# Patient Record
Sex: FEMALE | Race: OTHER | URBAN - METROPOLITAN AREA
[De-identification: names, ages, dates, MRNs, and addresses within clinical notes are randomized per-mention and may not be internally consistent; named-entity substitution may affect disease eponyms.]

---

## 2020-03-03 NOTE — PATIENT DISCUSSION
[facility] eye lid treatment OU qhs.
Discussed condition and exacerbating conditions/situations (e.g., dry/arid environments, overhead fans, air conditioners, side effect of medications).
Discussed lid hygiene, warm compress and eyelid wash.
Discussed the importance of blood pressure control in the prevention of ocular complications.
Discussed the risks/benefits of laser capsulotomy. Observation recommended.
Doxyclycline 100 mg PO q12h x 2 weeks. completed.
Doxyclycline PO.
Erythromycin ointment.
Good postoperative appearance.
Good postoperative appearance. Follow.
Maxitrol Raf apply eyelid margins BID x 2 weeks. Stop.
Monitor.
Patient is cleared for anesthesia.
Patient made aware of 24/7 emergency services.
Photo next visit.
Recommended artificial tears to use: 1 drop 4x a day in both eyes.
Recommended evaluation by oculoplastic specialist for further treatment.
Recommended observation.
Recommended warm compresses twice daily.
Warm Compresses 5 min 3x/day x 2 weeks.
Warm compresses / lid scrubs twice daily.
all options explained, no ailyn, leidy, YOHANNES meng OU.
endocoat, topical.
scl have been out for 2 weeks.
Yes

## 2020-03-04 NOTE — PATIENT DISCUSSION
Surgical vs nonsurgical treatment options were discussed.  Patient desires to pursue conservative therapy.  Begin Maxitrol drops TID x 2 weeks OD then stop.  Patient to return  in 3 weeks for follow up possible biopsy if no resolution of lid margin involvement.

## 2021-11-11 NOTE — PROCEDURE NOTE: SURGICAL
<p>Prior to commencing surgery patient identification, surgical procedure, site, and side were confirmed by Dr. Isaias Canela. Following topical proparacaine anesthesia, the patient was positioned at the YAG laser, a contact lens coupled to the cornea with methylcellulose and an axial posterior capsulotomy performed without complication using 3.0 Mj x 82. Excess methylcellulose was washed from the eye, one drop of Alphagan was instilled and the patient returned to the holding area having tolerated the procedure well and without complication. </p><p>MR [id number]</p>

## 2022-03-04 ENCOUNTER — NEW PATIENT (OUTPATIENT)
Dept: URBAN - METROPOLITAN AREA CLINIC 14 | Facility: CLINIC | Age: 67
End: 2022-03-04

## 2022-03-04 DIAGNOSIS — H05.89: ICD-10-CM

## 2022-03-04 PROCEDURE — 92083 EXTENDED VISUAL FIELD XM: CPT

## 2022-03-04 PROCEDURE — 99202 OFFICE O/P NEW SF 15 MIN: CPT

## 2022-03-04 ASSESSMENT — VISUAL ACUITY
OD_CC: 20/30
OS_CC: 20/30

## 2022-03-04 ASSESSMENT — TONOMETRY
OD_IOP_MMHG: 17
OS_IOP_MMHG: 22

## 2022-03-04 NOTE — PATIENT DISCUSSION
"Patient presents w/ achiness and a ""cottony"" feeling involving the right upper eyelid.  No significant abnormalities were noted on today's examination.  Recommend CT scan of both orbits w/o contrast in order to rule out any significant issues.  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  Recommend patient follow up after obtaining the results of the scan.  Patient expressed understanding. "

## 2022-03-04 NOTE — PATIENT DISCUSSION
"Patient presents w/ achiness and a ""cottony"" feeling involving the left upper eyelid.  No significant abnormalities were noted on today's examination.  Recommend CT scan of both orbits w/o contrast in order to rule out any significant issues.  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  Recommend patient follow up after obtaining the results of the scan.  Patient expressed understanding. "

## 2022-06-18 RX ORDER — AZELASTINE 1 MG/ML
SPRAY, METERED NASAL
COMMUNITY

## 2022-06-18 RX ORDER — AMLODIPINE BESYLATE 2.5 MG/1
TABLET ORAL
COMMUNITY
End: 2022-08-25 | Stop reason: SDUPTHER

## 2022-06-18 RX ORDER — TIZANIDINE 2 MG/1
TABLET ORAL
COMMUNITY
End: 2022-08-04 | Stop reason: SDUPTHER

## 2022-06-18 RX ORDER — TRAZODONE HYDROCHLORIDE 100 MG/1
TABLET ORAL
COMMUNITY
End: 2022-08-25 | Stop reason: SDUPTHER

## 2022-06-18 RX ORDER — DENOSUMAB 60 MG/ML
INJECTION SUBCUTANEOUS
COMMUNITY

## 2022-06-18 RX ORDER — EXEMESTANE 25 MG/1
TABLET ORAL
COMMUNITY
End: 2022-08-25 | Stop reason: SDUPTHER

## 2022-06-18 RX ORDER — PRAVASTATIN SODIUM 20 MG
TABLET ORAL
COMMUNITY
End: 2022-08-25 | Stop reason: SDUPTHER

## 2022-07-15 NOTE — PATIENT DISCUSSION
Patient is in the supine position.    Recommended evaluation by oculoplastic specialist for further treatment.

## 2022-08-12 PROBLEM — F41.8 DEPRESSION WITH ANXIETY: Status: ACTIVE | Noted: 2022-08-12

## 2022-08-12 PROBLEM — E03.9 HYPOTHYROIDISM: Status: ACTIVE | Noted: 2022-08-12

## 2022-08-12 PROBLEM — D64.9 ANEMIA: Status: ACTIVE | Noted: 2022-08-12

## 2022-08-12 PROBLEM — G82.22 CHRONIC INCOMPLETE PARAPLEGIA (HCC): Status: ACTIVE | Noted: 2022-08-12

## 2022-08-12 PROBLEM — G47.00 INSOMNIA: Status: ACTIVE | Noted: 2022-08-12

## 2022-08-12 PROBLEM — C50.911 MALIGNANT NEOPLASM OF RIGHT FEMALE BREAST (HCC): Status: ACTIVE | Noted: 2022-08-12

## 2022-08-12 PROBLEM — E78.2 MIXED DYSLIPIDEMIA: Status: ACTIVE | Noted: 2022-08-12

## 2022-08-12 PROBLEM — M81.0 OSTEOPOROSIS: Status: ACTIVE | Noted: 2022-08-12

## 2022-08-12 PROBLEM — I10 ESSENTIAL (PRIMARY) HYPERTENSION: Status: ACTIVE | Noted: 2022-08-12

## 2022-09-07 NOTE — PATIENT DISCUSSION
Patient defers removal today and opt to monitor for change, will RTC in 4 weeks  for re-examination and possible biopsy at that time.

## 2022-09-07 NOTE — PATIENT DISCUSSION
Discussed with patient (and confirmed with JPF), brow lift surgery may trigger trigeminal neuralgia.

## 2022-10-26 NOTE — PATIENT DISCUSSION
Recommended Direct Brow lift per Dr. Eric Landin Patient will discuss further with her neurologist before scheduling.

## 2022-11-04 PROBLEM — E87.6 HYPOKALEMIA: Status: ACTIVE | Noted: 2022-11-04

## 2022-12-15 NOTE — PROCEDURE NOTE: CLINICAL
PROCEDURE NOTE: Epilation #1 Left Upper Lid. Diagnosis: Trichiasis without Entropion. Anesthesia: Topical. Prior to treatment, the risks/benefits/alternatives were discussed. The patient wished to proceed with procedure. Aberrant lashes removed from BRAN using microforcep. Patient tolerated procedure well. There were no complications. Post-op instructions given. Grabiel Sin

## 2022-12-15 NOTE — PATIENT DISCUSSION
Recommended Direct Brow lift per Dr. Frida Arreola Patient will discuss further with her neurologist before scheduling.

## 2023-01-26 NOTE — PATIENT DISCUSSION
Recommended Direct Brow lift per Dr. Yamila Chandler Patient will discuss further with her neurologist before scheduling.

## 2023-10-24 ENCOUNTER — ESTABLISHED PATIENT (OUTPATIENT)
Dept: URBAN - METROPOLITAN AREA CLINIC 11 | Facility: CLINIC | Age: 68
End: 2023-10-24

## 2023-10-24 DIAGNOSIS — H43.312: ICD-10-CM

## 2023-10-24 DIAGNOSIS — H43.813: ICD-10-CM

## 2023-10-24 DIAGNOSIS — H35.373: ICD-10-CM

## 2023-10-24 PROCEDURE — 99214 OFFICE O/P EST MOD 30 MIN: CPT

## 2023-10-24 PROCEDURE — 92134 CPTRZ OPH DX IMG PST SGM RTA: CPT

## 2023-10-24 ASSESSMENT — TONOMETRY
OD_IOP_MMHG: 15
OS_IOP_MMHG: 13

## 2023-10-24 ASSESSMENT — VISUAL ACUITY
OS_CC: 20/25+2
OD_CC: 20/25+1

## 2023-12-06 ENCOUNTER — SURGERY/PROCEDURE (OUTPATIENT)
Dept: URBAN - METROPOLITAN AREA EYE CENTER 1 | Facility: EYE CENTER | Age: 68
End: 2023-12-06

## 2023-12-06 DIAGNOSIS — H43.312: ICD-10-CM

## 2023-12-06 PROCEDURE — 67036 REMOVAL OF INNER EYE FLUID: CPT

## 2023-12-07 ENCOUNTER — POST-OP (OUTPATIENT)
Dept: URBAN - METROPOLITAN AREA CLINIC 11 | Facility: CLINIC | Age: 68
End: 2023-12-07

## 2023-12-07 DIAGNOSIS — H43.312: ICD-10-CM

## 2023-12-07 PROCEDURE — 99024 POSTOP FOLLOW-UP VISIT: CPT

## 2023-12-07 ASSESSMENT — TONOMETRY: OS_IOP_MMHG: 15

## 2023-12-07 ASSESSMENT — VISUAL ACUITY
OS_SC: 20/200-1
OD_CC: 20/20-1
OS_PH: 20/60+2

## 2023-12-19 ENCOUNTER — POST-OP (OUTPATIENT)
Dept: URBAN - METROPOLITAN AREA CLINIC 11 | Facility: CLINIC | Age: 68
End: 2023-12-19

## 2023-12-19 DIAGNOSIS — H43.312: ICD-10-CM

## 2023-12-19 PROCEDURE — 99024 POSTOP FOLLOW-UP VISIT: CPT

## 2023-12-19 ASSESSMENT — TONOMETRY
OD_IOP_MMHG: 12
OS_IOP_MMHG: 12

## 2023-12-19 ASSESSMENT — VISUAL ACUITY: OS_CC: 20/20

## 2024-10-01 NOTE — PATIENT DISCUSSION
[facility]: Cancer Care                                                                                      Writer faxed today's visit notes to Dr. Hernandez and Dr. Crowley per patient request.  Recived confirmation from both fax numbers that they went through successfully.    Genet Hansen, RN, BSN, OCN  Oncology RN Care Coordinator  [facility]   all options explained, no ailyn, leidy, YOHANNES meng OU.